# Patient Record
Sex: MALE | Race: WHITE | NOT HISPANIC OR LATINO | URBAN - METROPOLITAN AREA
[De-identification: names, ages, dates, MRNs, and addresses within clinical notes are randomized per-mention and may not be internally consistent; named-entity substitution may affect disease eponyms.]

---

## 2021-12-27 ENCOUNTER — OFFICE VISIT (OUTPATIENT)
Dept: URBAN - METROPOLITAN AREA CLINIC 35 | Facility: CLINIC | Age: 51
End: 2021-12-27

## 2022-04-01 ENCOUNTER — WEB ENCOUNTER (OUTPATIENT)
Dept: URBAN - METROPOLITAN AREA CLINIC 35 | Facility: CLINIC | Age: 52
End: 2022-04-01

## 2022-04-01 ENCOUNTER — OFFICE VISIT (OUTPATIENT)
Dept: URBAN - METROPOLITAN AREA CLINIC 35 | Facility: CLINIC | Age: 52
End: 2022-04-01
Payer: COMMERCIAL

## 2022-04-01 VITALS
DIASTOLIC BLOOD PRESSURE: 80 MMHG | SYSTOLIC BLOOD PRESSURE: 148 MMHG | OXYGEN SATURATION: 96 % | WEIGHT: 160 LBS | BODY MASS INDEX: 25.71 KG/M2 | HEART RATE: 94 BPM | HEIGHT: 66 IN

## 2022-04-01 DIAGNOSIS — Z12.11 COLON CANCER SCREENING: ICD-10-CM

## 2022-04-01 DIAGNOSIS — R12 HEARTBURN: ICD-10-CM

## 2022-04-01 PROCEDURE — 99204 OFFICE O/P NEW MOD 45 MIN: CPT | Performed by: PHYSICIAN ASSISTANT

## 2022-04-01 RX ORDER — SODIUM PICOSULFATE, MAGNESIUM OXIDE, AND ANHYDROUS CITRIC ACID 10; 3.5; 12 MG/160ML; G/160ML; G/160ML
160 ML LIQUID ORAL
Qty: 320 MILLILITER | Refills: 0 | OUTPATIENT
Start: 2022-04-01 | End: 2022-04-02

## 2022-04-01 NOTE — HPI-HEARTBURN
Patient admits heartburn occasionally depending on what patient's diet.  He will take a Tums but it's not effectual.  He states he has heartburn about a couple times a month.  He states bad eating like fried foods will trigger symptoms.

## 2022-04-01 NOTE — HPI-COLORECTAL SCREENING
Patient presents today for a colorectal cancer screening. He currently admits 1 bowel movement per day with normal and formed stools. Patient denies seeing any blood or mucous in the stool. Patient denies' melena. Patient admits this is his first colonoscopy. Denies history of bleeding disorders or respiratory diseases. Denies any previous complications with anesthesia. Denies history of spinal cord injuries.

## 2022-05-09 ENCOUNTER — OFFICE VISIT (OUTPATIENT)
Dept: URBAN - METROPOLITAN AREA SURGERY CENTER 8 | Facility: SURGERY CENTER | Age: 52
End: 2022-05-09
Payer: COMMERCIAL

## 2022-05-09 DIAGNOSIS — Z12.11 COLON CANCER SCREENING: ICD-10-CM

## 2022-05-09 PROCEDURE — G8907 PT DOC NO EVENTS ON DISCHARG: HCPCS | Performed by: INTERNAL MEDICINE

## 2022-05-09 PROCEDURE — G0121 COLON CA SCRN NOT HI RSK IND: HCPCS | Performed by: INTERNAL MEDICINE

## 2022-05-20 ENCOUNTER — TELEPHONE ENCOUNTER (OUTPATIENT)
Dept: URBAN - METROPOLITAN AREA CLINIC 36 | Facility: CLINIC | Age: 52
End: 2022-05-20

## 2022-05-23 ENCOUNTER — OFFICE VISIT (OUTPATIENT)
Dept: URBAN - METROPOLITAN AREA CLINIC 35 | Facility: CLINIC | Age: 52
End: 2022-05-23

## 2022-05-23 ENCOUNTER — DASHBOARD ENCOUNTERS (OUTPATIENT)
Age: 52
End: 2022-05-23

## 2022-05-23 NOTE — HPI-COLONOSCOPY FOLLOWUP
51 Year old male patient presents today for a follow up from his/her colonoscopy. He admits/denies any complications after his/her procedure. He currently reports -- bowel movements per --, with/without strain. His stools are -- and --. He admits/denies any mucus, melena or blood in stools. Admits/Denies any pruritus ani or rectal pain.   Last Visit (04/01/22) Patient presents today for a colorectal cancer screening. He currently admits 1 bowel movement per day with normal and formed stools. Patient denies seeing any blood or mucous in the stool. Patient denies' melena. Patient admits this is his first colonoscopy. Denies history of bleeding disorders or respiratory diseases. Denies any previous complications with anesthesia. Denies history of spinal cord injuries.

## 2022-05-23 NOTE — HPI-HEARTBURN
?He has been taking Pepcid prn and following the Anti-reflux diet with improvement of heartburn symptoms.     Last Visit (04/01/22)Patient admits heartburn occasionally depending on what patient's diet.  He will take a Tums but it's not effectual.  He states he has heartburn about a couple of times a month.  He states bad eating like fried foods will trigger symptoms.